# Patient Record
Sex: MALE | Race: WHITE | NOT HISPANIC OR LATINO
[De-identification: names, ages, dates, MRNs, and addresses within clinical notes are randomized per-mention and may not be internally consistent; named-entity substitution may affect disease eponyms.]

---

## 2017-01-09 ENCOUNTER — APPOINTMENT (OUTPATIENT)
Dept: ORTHOPEDIC SURGERY | Facility: CLINIC | Age: 42
End: 2017-01-09

## 2017-01-18 ENCOUNTER — RX RENEWAL (OUTPATIENT)
Age: 42
End: 2017-01-18

## 2017-02-13 ENCOUNTER — APPOINTMENT (OUTPATIENT)
Dept: ORTHOPEDIC SURGERY | Facility: CLINIC | Age: 42
End: 2017-02-13

## 2017-02-13 DIAGNOSIS — D17.79 BENIGN LIPOMATOUS NEOPLASM OF OTHER SITES: ICD-10-CM

## 2017-03-23 ENCOUNTER — RX RENEWAL (OUTPATIENT)
Age: 42
End: 2017-03-23

## 2017-04-25 ENCOUNTER — RX RENEWAL (OUTPATIENT)
Age: 42
End: 2017-04-25

## 2017-05-18 ENCOUNTER — RX RENEWAL (OUTPATIENT)
Age: 42
End: 2017-05-18

## 2017-07-31 ENCOUNTER — RX RENEWAL (OUTPATIENT)
Age: 42
End: 2017-07-31

## 2017-08-14 ENCOUNTER — RX RENEWAL (OUTPATIENT)
Age: 42
End: 2017-08-14

## 2017-11-28 ENCOUNTER — RX RENEWAL (OUTPATIENT)
Age: 42
End: 2017-11-28

## 2018-01-08 ENCOUNTER — MOBILE ON CALL (OUTPATIENT)
Age: 43
End: 2018-01-08

## 2018-02-26 ENCOUNTER — APPOINTMENT (OUTPATIENT)
Dept: ORTHOPEDIC SURGERY | Facility: CLINIC | Age: 43
End: 2018-02-26

## 2021-08-21 ENCOUNTER — TRANSCRIPTION ENCOUNTER (OUTPATIENT)
Age: 46
End: 2021-08-21

## 2021-08-23 ENCOUNTER — TRANSCRIPTION ENCOUNTER (OUTPATIENT)
Age: 46
End: 2021-08-23

## 2022-05-10 ENCOUNTER — NON-APPOINTMENT (OUTPATIENT)
Age: 47
End: 2022-05-10

## 2022-05-18 ENCOUNTER — APPOINTMENT (OUTPATIENT)
Dept: GASTROENTEROLOGY | Facility: CLINIC | Age: 47
End: 2022-05-18

## 2022-05-18 ENCOUNTER — APPOINTMENT (OUTPATIENT)
Dept: GASTROENTEROLOGY | Facility: CLINIC | Age: 47
End: 2022-05-18
Payer: COMMERCIAL

## 2022-05-18 VITALS
HEART RATE: 70 BPM | OXYGEN SATURATION: 98 % | TEMPERATURE: 98.7 F | WEIGHT: 279 LBS | BODY MASS INDEX: 34.69 KG/M2 | DIASTOLIC BLOOD PRESSURE: 80 MMHG | SYSTOLIC BLOOD PRESSURE: 135 MMHG | HEIGHT: 75 IN

## 2022-05-18 DIAGNOSIS — R10.9 UNSPECIFIED ABDOMINAL PAIN: ICD-10-CM

## 2022-05-18 DIAGNOSIS — R19.7 DIARRHEA, UNSPECIFIED: ICD-10-CM

## 2022-05-18 PROCEDURE — 99203 OFFICE O/P NEW LOW 30 MIN: CPT

## 2022-05-18 RX ORDER — TRAMADOL HYDROCHLORIDE AND ACETAMINOPHEN 37.5; 325 MG/1; MG/1
37.5-325 TABLET, FILM COATED ORAL
Qty: 60 | Refills: 0 | Status: DISCONTINUED | COMMUNITY
Start: 2017-01-09 | End: 2022-05-18

## 2022-05-18 RX ORDER — IBUPROFEN 800 MG/1
800 TABLET, FILM COATED ORAL
Qty: 90 | Refills: 3 | Status: DISCONTINUED | COMMUNITY
Start: 2018-01-08 | End: 2022-05-18

## 2022-05-18 NOTE — PHYSICAL EXAM
[General Appearance - Alert] : alert [General Appearance - In No Acute Distress] : in no acute distress [Sclera] : the sclera and conjunctiva were normal [Extraocular Movements] : extraocular movements were intact [Neck Appearance] : the appearance of the neck was normal [] : no respiratory distress [Exaggerated Use Of Accessory Muscles For Inspiration] : no accessory muscle use [Abdomen Soft] : soft [Abdomen Tenderness] : non-tender [Oriented To Time, Place, And Person] : oriented to person, place, and time [Impaired Insight] : insight and judgment were intact

## 2022-05-18 NOTE — HISTORY OF PRESENT ILLNESS
[de-identified] : 46M here after recent urgent care visit. Went to Urgent Care about one week ago w/ frequent yellow watery bowel movements (up to 10x per day) and diffuse cramping abdominal pain. No fevers, chills, hematochezia, n/v. Was concerned he had giardia as he has had it before. 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 He was prescribed flagyl, completed course today. Feels well now after abx. Currently having normal formed bowel movements 1-2x per day. Abdominal pain resolved. Appetite is back. No complaints today. \par \par Receives care at [facility] and follows w GI there. \par Had an egd/colonoscopy about 1 month ago, EGD reportedly showing inflammation at GE junction and colonoscopy significant for 2-3 benign polyps. No report available. \par \par No fhx of GI malignancy or IBD. \par

## 2022-05-18 NOTE — ASSESSMENT
[FreeTextEntry1] : 46M here after recent urgent care visit for cramping abdominal pain, diarrhea, urgency. Now resolved after tx w/ course of flagyl x 7d. Back to normal now, having formed 1-2 bms per day. Appetite and energy level normal. No abd pain, n/v, fevers, chills. Benign abdominal exam in office today. 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 No stool studies sent at time of urgent care visit. If recurrent diarrhea, will send stool studies \par - Advance diet as tolerated \par - Consider adding fermented foods or pre/probiotics to diet in post infectious state to aid gut microbiome \par - Otherwise, will continue to follow up w/ GI at [facility]. Recently had egd/colon as above (1 month ago, no report available) and will make appt to see them to discuss next steps and surveillance schedule \par \par RTC PRN \par

## 2024-02-24 ENCOUNTER — APPOINTMENT (OUTPATIENT)
Dept: ORTHOPEDIC SURGERY | Facility: CLINIC | Age: 49
End: 2024-02-24
Payer: OTHER MISCELLANEOUS

## 2024-02-24 PROCEDURE — 99204 OFFICE O/P NEW MOD 45 MIN: CPT

## 2024-02-24 PROCEDURE — 72100 X-RAY EXAM L-S SPINE 2/3 VWS: CPT

## 2024-02-24 RX ORDER — METHYLPREDNISOLONE 4 MG/1
4 TABLET ORAL
Qty: 1 | Refills: 0 | Status: ACTIVE | COMMUNITY
Start: 2024-02-24 | End: 1900-01-01

## 2024-02-26 ENCOUNTER — APPOINTMENT (OUTPATIENT)
Dept: MRI IMAGING | Facility: CLINIC | Age: 49
End: 2024-02-26

## 2024-02-26 ENCOUNTER — APPOINTMENT (OUTPATIENT)
Dept: MRI IMAGING | Facility: CLINIC | Age: 49
End: 2024-02-26
Payer: OTHER MISCELLANEOUS

## 2024-02-26 PROCEDURE — 72148 MRI LUMBAR SPINE W/O DYE: CPT

## 2024-02-26 PROCEDURE — 72158 MRI LUMBAR SPINE W/O & W/DYE: CPT

## 2024-02-26 NOTE — DATA REVIEWED
[Cervical Spine] : cervical spine [Outside X-rays] : outside x-rays [I independently reviewed and interpreted images and report] : I independently reviewed and interpreted images and report [FreeTextEntry2] : negative for fracture [FreeTextEntry1] : I stop paperwork reviewed

## 2024-02-26 NOTE — DISCUSSION/SUMMARY
[de-identified] : Discussed medical mgmt , prescribed Medrol pack, resume nsaid after steroid taper. Discussed exercise based rehabilitation, referred to PT. Referred for lumbar mri to r/o recurrent hnp.

## 2024-02-26 NOTE — PHYSICAL EXAM
[Normal Coordination] : normal coordination [Normal DTR UE/LE] : normal DTR UE/LE  [Normal Sensation] : normal sensation [Normal Mood and Affect] : normal mood and affect [Oriented] : oriented [Able to Communicate] : able to communicate [Normal Skin] : normal skin [No Rash] : no rash [No Ulcers] : no ulcers [No Lesions] : no lesions [No obvious lymphadenopathy in areas examined] : no obvious lymphadenopathy in areas examined [Well Developed] : well developed [Peripheral vascular exam is grossly normal] : peripheral vascular exam is grossly normal [No Respiratory Distress] : no respiratory distress [Extension] : extension [Flexion] : flexion [] : mildly antalgic
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
## 2024-03-04 ENCOUNTER — APPOINTMENT (OUTPATIENT)
Dept: ORTHOPEDIC SURGERY | Facility: CLINIC | Age: 49
End: 2024-03-04
Payer: OTHER MISCELLANEOUS

## 2024-03-04 VITALS — HEIGHT: 75 IN | BODY MASS INDEX: 34.69 KG/M2 | WEIGHT: 279 LBS

## 2024-03-04 PROCEDURE — 99214 OFFICE O/P EST MOD 30 MIN: CPT

## 2024-03-04 RX ORDER — CYCLOBENZAPRINE HYDROCHLORIDE 5 MG/1
5 TABLET, FILM COATED ORAL 3 TIMES DAILY
Qty: 90 | Refills: 0 | Status: ACTIVE | COMMUNITY
Start: 2024-03-04 | End: 1900-01-01
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
## 2024-03-04 NOTE — DISCUSSION/SUMMARY
[de-identified] : MRI reviewed demonstrating L4/5 left paracentral hnp . rt foraminal herniation L4/5. L5S1 left paracentral hnp. L3/4 prior discectomy w/o recurrent hnp. Advised the patient MRI images does not have the appearance for acute surgical intervention. He will continue his current PT trial, as well as muscle relaxer and NSAID as needed. Medication renewed. Can always refer to PM for LESI if pain persists. FUV in 6 wks. Remains temporarily totally disabled from customary work.  Prior to appointment and during encounter with patient extensive medical records were reviewed including but not limited to, hospital records, outpatient records, imaging results, and lab data.During this appointment the patient was examined, diagnoses were discussed and explained in a face to face manner. In addition extensive time was spent reviewing aforementioned diagnostic studies. Counseling including abnormal image results, differential diagnoses, treatment options, risk and benefits, lifestyle changes, current condition, and current medications was performed. Patient's comments, questions, and concerns were addressed and patient verbalized understanding. Based on this patient's presentation at our office, which is an orthopedic spine surgeon's office, this patient inherently / intrinsically has a risk, however minute, of developing issues such as Cauda equina syndrome, bowel and bladder changes, or progression of motor or neurological deficits such as paralysis which may be permanent.  HENNA BAKER Acting as a Scribe for Dr. Too MOJICA, Henna Baker, attest that this documentation has been prepared under the direction and in the presence of Provider Devyn Gage MD.

## 2024-03-04 NOTE — PHYSICAL EXAM
[Normal DTR UE/LE] : normal DTR UE/LE  [Normal Coordination] : normal coordination [Normal Mood and Affect] : normal mood and affect [Normal Sensation] : normal sensation [Oriented] : oriented [No Rash] : no rash [Able to Communicate] : able to communicate [Normal Skin] : normal skin [No Lesions] : no lesions [No Ulcers] : no ulcers [No obvious lymphadenopathy in areas examined] : no obvious lymphadenopathy in areas examined [Peripheral vascular exam is grossly normal] : peripheral vascular exam is grossly normal [Well Developed] : well developed [No Respiratory Distress] : no respiratory distress [Flexion] : flexion [Extension] : extension [] : negative Spurling

## 2024-03-05 ENCOUNTER — TRANSCRIPTION ENCOUNTER (OUTPATIENT)
Age: 49
End: 2024-03-05

## 2024-03-18 ENCOUNTER — APPOINTMENT (OUTPATIENT)
Dept: ORTHOPEDIC SURGERY | Facility: CLINIC | Age: 49
End: 2024-03-18
Payer: OTHER MISCELLANEOUS

## 2024-03-18 VITALS — HEIGHT: 75 IN | WEIGHT: 279 LBS | BODY MASS INDEX: 34.69 KG/M2

## 2024-03-18 DIAGNOSIS — M25.552 PAIN IN LEFT HIP: ICD-10-CM

## 2024-03-18 PROCEDURE — 99214 OFFICE O/P EST MOD 30 MIN: CPT

## 2024-03-18 PROCEDURE — 73503 X-RAY EXAM HIP UNI 4/> VIEWS: CPT | Mod: LT
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
## 2024-03-18 NOTE — DISCUSSION/SUMMARY
[de-identified] : The patient was advised of the diagnosis.  The natural history of the pathology was explained in full to the patient in layman's terms. All questions were answered.  The risks and benefits of surgical and non-surgical treatment alternatives were explained in full to the patient.

## 2024-03-18 NOTE — IMAGING
[Left] : left hip with pelvis [AP] : anteroposterior [Lateral] : lateral [Femoral CAM lesion with Alpha angle greater than 50] : Femoral CAM lesion with Alpha angle greater than 50 [de-identified] : Left hip: No swelling.  Iliac crest tenderness, greater troch tenderness.  Low back tenderness.  Neg impingement.  No pain with resisted SLR.  Neg SABRINA.   Limited IR.  NVI.  Lateral pain with resisted abduction.

## 2024-03-18 NOTE — WORK
[Partial] : partial [Does not reveal pre-existing condition(s) that may affect treatment/prognosis] : does not reveal pre-existing condition(s) that may affect treatment/prognosis [Unknown at this time] : : unknown at this time [Can return to work with limitations on ______] : can return to work with limitations on [unfilled] [No Rx restrictions] : No Rx restrictions. [Patient] : patient [I provided the services listed above] :  I provided the services listed above. [FreeTextEntry1] : fair

## 2024-04-08 ENCOUNTER — APPOINTMENT (OUTPATIENT)
Dept: ORTHOPEDIC SURGERY | Facility: CLINIC | Age: 49
End: 2024-04-08
Payer: OTHER MISCELLANEOUS

## 2024-04-08 VITALS — BODY MASS INDEX: 36.68 KG/M2 | HEIGHT: 75 IN | WEIGHT: 295 LBS

## 2024-04-08 PROCEDURE — 99214 OFFICE O/P EST MOD 30 MIN: CPT

## 2024-04-09 ENCOUNTER — APPOINTMENT (OUTPATIENT)
Dept: PAIN MANAGEMENT | Facility: CLINIC | Age: 49
End: 2024-04-09
Payer: OTHER MISCELLANEOUS

## 2024-04-09 VITALS — HEIGHT: 75 IN | WEIGHT: 295 LBS | BODY MASS INDEX: 36.68 KG/M2

## 2024-04-09 PROCEDURE — 99204 OFFICE O/P NEW MOD 45 MIN: CPT

## 2024-04-09 NOTE — PHYSICAL EXAM
[Normal Coordination] : normal coordination [Normal DTR UE/LE] : normal DTR UE/LE  [Normal Sensation] : normal sensation [Normal Mood and Affect] : normal mood and affect [Oriented] : oriented [Able to Communicate] : able to communicate [Normal Skin] : normal skin [No Rash] : no rash [No Ulcers] : no ulcers [No Lesions] : no lesions [No obvious lymphadenopathy in areas examined] : no obvious lymphadenopathy in areas examined [Well Developed] : well developed [Peripheral vascular exam is grossly normal] : peripheral vascular exam is grossly normal [No Respiratory Distress] : no respiratory distress [Flexion] : flexion [Extension] : extension [] : negative Spurling

## 2024-04-09 NOTE — DISCUSSION/SUMMARY
[de-identified] : MRI reviewed demonstrating L4/5 left paracentral hnp . rt foraminal herniation L4/5. L5S1 left paracentral hnp. L3/4 prior discectomy w/o recurrent hnp. Advised the patient MRI images does not have the appearance for acute surgical intervention. He will continue his current PT trial and transition into HEP, as well as muscle relaxer and NSAID as needed. Medication renewed. He will keep consult with Dr. Laura to discuss possible interventional injections. Patient will return to full duty work starting 4/17/24 - work note provided. FUV PRN.   Prior to appointment and during encounter with patient extensive medical records were reviewed including but not limited to, hospital records, outpatient records, imaging results, and lab data.During this appointment the patient was examined, diagnoses were discussed and explained in a face to face manner. In addition extensive time was spent reviewing aforementioned diagnostic studies. Counseling including abnormal image results, differential diagnoses, treatment options, risk and benefits, lifestyle changes, current condition, and current medications was performed. Patient's comments, questions, and concerns were addressed and patient verbalized understanding. Based on this patient's presentation at our office, which is an orthopedic spine surgeon's office, this patient inherently / intrinsically has a risk, however minute, of developing issues such as Cauda equina syndrome, bowel and bladder changes, or progression of motor or neurological deficits such as paralysis which may be permanent.  HENNA JACOB Acting as a Scribe for Henna Gr, attest that this documentation has been prepared under the direction and in the presence of Provider Devyn Gage MD.

## 2024-04-09 NOTE — DATA REVIEWED
[MRI] : MRI [Lumbar Spine] : lumbar spine [Report was reviewed and noted in the chart] : The report was reviewed and noted in the chart [I independently reviewed and interpreted images and report] : I independently reviewed and interpreted images and report [I reviewed the films/CD and additionally noted] : I reviewed the films/CD and additionally noted [FreeTextEntry1] : I stop paperwork reviewed PT progress notes reviewed Orthopedic progress notes reviewed
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
## 2024-04-09 NOTE — WORK
[Was the competent medical cause of the injury] : was the competent medical cause of the injury [Are consistent with the injury] : are consistent with the injury [Consistent with my objective findings] : consistent with my objective findings [Partial] : partial [Patient] : patient [No Rx restrictions] : No Rx restrictions. [I provided the services listed above] :  I provided the services listed above.
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
## 2024-04-09 NOTE — PHYSICAL EXAM
[de-identified] : Constitutional:   - No acute distress   - Well developed; well nourished    Neurological:   - normal mood and affect   - alert and oriented x 3     Cardiovascular:   - grossly normal   Lumbar Spine Exam:   Inspection: Well healed surgical scar midline erythema (-)  ecchymosis (-)  rashes (-)  alignment: no scoliosis   Palpation:  Midline lumbar tenderness:            (-)  midline thoracic tenderness:          (-)  Lumbar paraspinal tenderness:  L (+) ; R (-)  thoracic paraspinal tenderness: L (-) ; R (-)  sciatic nerve tenderness :          L (-) ; R (-)  SI joint tenderness:                     L (-) ; R (-)  GTB tenderness:                        L (-);  R (-)   ROM: WNL  pain with extreme extension  Strength:                                     Right       Left     Hip Flexion:                (5/5)       (5/5)  Quadriceps:               (5/5)       (5/5)  Hamstrings:                (5/5)       (5/5)  Ankle Dorsiflexion:     (5/5)       (5/5)  EHL:                           (5/5)       (5/5)  Ankle Plantarflexion:  (5/5)       (5/5)   Special Tests:  SLR:                            R (-) ; L (=)  Facet loading:             R (-) ; L (+)  SABRINA test:                R (-) ; L (-)  Hamstring tightness:   R (+);  L (+)   Neurologic:  SILT throughout right lower extremity  SILT throughout left lower extremity   Reflexes normal and symmetric bilateral lower extremities   Gait:  non- antalgic gait  ambulates without assistive device

## 2024-04-09 NOTE — ASSESSMENT
[FreeTextEntry1] : A discussion regarding available pain management treatment options occurred with the patient.  These included interventional, rehabilitative, pharmacological, and alternative modalities. We will proceed with the following:    Interventional treatment options:   - Proceed with left L3-L4, L4-L5 TFESI with fluoroscopic guidance - Will consider left facet directed intervention with ongoing axial low back pain - see additional instructions below    Rehabilitative options:  - continue physical therapy   - participation in active HEP was discussed and encouraged as tolerated  Medication based treatment options:   - Continue ibuprofen 400-600 mg up to TID as needed - continue cyclobenzaprine 5 mg up to TID as needed for spasm - see additional instructions below    Complementary treatment options:   - Weight management and lifestyle modifications discussed  Additional treatment recommendations as follows:   - patient will follow-up with Dr. Gage as directed - Follow up 1-2 weeks post injection for assessment of efficacy and further treatment recommendations  We have discussed the risks, benefits, and alternatives for NSAID therapy including but not limited to the risk of bleeding, thrombosis, gastric mucosal irritation/ulceration, allergic reaction and kidney dysfunction.  The patient verbalizes an understanding.  The risks, benefits and alternatives of the proposed procedure were explained in detail with the patient. The risks outlined include but are not limited to infection, bleeding, post- dural puncture headache, nerve injury, a temporary increase in pain, failure to resolve symptoms, need for future interventions, allergic reaction, and possible elevation of blood sugar in diabetics if using corticosteroid.  All questions were answered to patient's apparent satisfaction, and he/she verbalized an understanding.  The documentation recorded by the scribe, in my presence, accurately reflects the service I personally performed and the decisions made by me with my edits as appropriate.   I, Mauro Rodriguez acting as scribe, attest that this documentation has been prepared under the direction and in the presence of Provider Porfirio Laura DO.

## 2024-05-03 ENCOUNTER — APPOINTMENT (OUTPATIENT)
Dept: PAIN MANAGEMENT | Facility: CLINIC | Age: 49
End: 2024-05-03
Payer: OTHER MISCELLANEOUS

## 2024-05-03 PROCEDURE — 64483 NJX AA&/STRD TFRM EPI L/S 1: CPT | Mod: LT

## 2024-05-03 PROCEDURE — 64484 NJX AA&/STRD TFRM EPI L/S EA: CPT | Mod: LT,59

## 2024-05-03 NOTE — PROCEDURE
[FreeTextEntry3] : Date of Service: 05/03/2024   Account: [id number]  Patient: MARIO BENNETT   YOB: 1975  Age: 48 year  Surgeon: Porfirio Laura D.O.  Assistant: None.  Pre-Operative Diagnosis:   Lumbosacral Radiculitis (M54.17)  Post Operative Diagnosis: Lumbosacral Radiculitis (M54.17)  Procedure: Left L3-L4, L4-L5 transforaminal epidural steroid injection under fluoroscopic guidance.  Anesthesia: MAC  This procedure was carried out using fluoroscopic guidance.  The risks and benefits of the procedure were discussed extensively with the patient.  The consent of the patient was obtained and the following procedure was performed. The patient was placed in the prone position on the fluoroscopy table and the area was prepped and draped in a sterile fashion.  A timeout was performed with all essential staff present and the site and side were verified.  The left L3-L4 neural foramen was then identified on right oblique "mary lou dog" anatomical view at the 6 o' clock position using fluoroscopic guidance, and the area was marked. The overlying skin and subcutaneous structures were anesthetized using sterile technique with 1% Lidocaine.   A 22-gauge 5-inch spinal needle was directed toward the inferior (6 o'clock) position of the pedicle, which formed the roof of the identified foramen.  Once in the epidural space, after negative aspiration for heme and CSF, 1cc of Omnipaque contrast was injected to confirm epidural location and assess filling defects and rule out intravascular needle placement.  Lumbar epidurogram showed no intravascular or intrathecal flow pattern.  No blood or CSF was aspirated.  Omnipaque spread medially in epidural space and outlined the exiting nerve root.  After this, 2.5 cc of a mixture of 4cc of preservative free normal saline plus 40mg of Kenalog was injected in the epidural space  The left L4-L5 neural foramen was then identified on right oblique "mary lou dog" anatomical view at the 6 o' clock position using fluoroscopic guidance, and the area was marked. The overlying skin and subcutaneous structures were anesthetized using sterile technique with 1% Lidocaine.   A 22-gauge 5-inch spinal needle was directed toward the inferior (6 o'clock) position of the pedicle, which formed the roof of the identified foramen.  Once in the epidural space, after negative aspiration for heme and CSF, 1cc of Omnipaque contrast was injected to confirm epidural location and assess filling defects and rule out intravascular needle placement.  Lumbar epidurogram showed no intravascular or intrathecal flow pattern.  No blood or CSF was aspirated. Omnipaque spread medially in epidural space and outlined the exiting nerve root.  After this, the remainder of the injectate listed above was injected in the epidural space.  Vital signs remained normal throughout the procedure.  The patient tolerated the procedure well.  There were no immediate complications from the performed procedure.  The patient was instructed to apply ice over the injection sites for twenty minutes every two hours for the next 24 hours.  Disposition:      1. The patient was advised to F/U in 1-2 weeks to assess the response to the injection.      2. The patient was also instructed to contact me immediately if there were any concerns related to the procedure performed.

## 2024-05-23 ENCOUNTER — APPOINTMENT (OUTPATIENT)
Dept: PAIN MANAGEMENT | Facility: CLINIC | Age: 49
End: 2024-05-23
Payer: OTHER MISCELLANEOUS

## 2024-05-23 DIAGNOSIS — M51.36 OTHER INTERVERTEBRAL DISC DEGENERATION, LUMBAR REGION: ICD-10-CM

## 2024-05-23 DIAGNOSIS — M47.816 SPONDYLOSIS W/OUT MYELOPATHY OR RADICULOPATHY, LUMBAR REGION: ICD-10-CM

## 2024-05-23 PROCEDURE — 99214 OFFICE O/P EST MOD 30 MIN: CPT
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
## 2024-05-23 NOTE — PHYSICAL EXAM
[de-identified] : Constitutional:   - No acute distress   - Well developed; well nourished    Neurological:   - normal mood and affect   - alert and oriented x 3     Cardiovascular:   - grossly normal   Lumbar Spine Exam:   Inspection: Well healed surgical scar midline erythema (-)  ecchymosis (-)  rashes (-)  alignment: no scoliosis   Palpation:  Midline lumbar tenderness:            (-)  midline thoracic tenderness:          (-)  Lumbar paraspinal tenderness:  L (+) ; R (-)  thoracic paraspinal tenderness: L (-) ; R (-)  sciatic nerve tenderness :          L (-) ; R (-)  SI joint tenderness:                     L (-) ; R (-)  GTB tenderness:                        L (-);  R (-)   ROM: WNL  pain with extreme extension  Strength:                                     Right       Left     Hip Flexion:                (5/5)       (5/5)  Quadriceps:               (5/5)       (5/5)  Hamstrings:                (5/5)       (5/5)  Ankle Dorsiflexion:     (5/5)       (5/5)  EHL:                           (5/5)       (5/5)  Ankle Plantarflexion:  (5/5)       (5/5)   Special Tests:  SLR:                            R (-) ; L (=)  Facet loading:             R (-) ; L (+)  SABRINA test:                R (-) ; L (-)  Hamstring tightness:   R (+);  L (+)   Neurologic:  SILT throughout right lower extremity  SILT throughout left lower extremity   Reflexes normal and symmetric bilateral lower extremities   Gait:  non- antalgic gait  ambulates without assistive device

## 2024-05-23 NOTE — ASSESSMENT
[FreeTextEntry1] : A discussion regarding available pain management treatment options occurred with the patient.  These included interventional, rehabilitative, pharmacological, and alternative modalities. We will proceed with the following:    Interventional treatment options:   - Proceed with repeat left L3-L4, L4-L5 TFESI (80 mg Kenalog) with fluoroscopic guidance - Will consider lumbar facet directed intervention (vs. BVN ablation) for ongoing axial low back pain - see additional instructions below    Rehabilitative options:  - continue physical therapy   - participation in active HEP was discussed and encouraged as tolerated  Medication based treatment options:   - continue ibuprofen 400-600 mg up to TID as needed - continue cyclobenzaprine 5 mg up to TID as needed for spasm - see additional instructions below    Complementary treatment options:   - Weight management and lifestyle modifications discussed  Additional treatment recommendations as follows:   - patient will follow-up with Dr. Gage as directed - Follow up 1-2 weeks post injection for assessment of efficacy and further treatment recommendations  We have discussed the risks, benefits, and alternatives for NSAID therapy including but not limited to the risk of bleeding, thrombosis, gastric mucosal irritation/ulceration, allergic reaction and kidney dysfunction.  The patient verbalizes an understanding.  The risks, benefits and alternatives of the proposed procedure were explained in detail with the patient. The risks outlined include but are not limited to infection, bleeding, post- dural puncture headache, nerve injury, a temporary increase in pain, failure to resolve symptoms, need for future interventions, allergic reaction, and possible elevation of blood sugar in diabetics if using corticosteroid.  All questions were answered to patient's apparent satisfaction, and he/she verbalized an understanding.  The documentation recorded by the scribe, in my presence, accurately reflects the service I personally performed and the decisions made by me with my edits as appropriate.   I, Mauro Rodriguez acting as scribe, attest that this documentation has been prepared under the direction and in the presence of Provider Porfirio Laura DO.

## 2024-05-23 NOTE — WORK
[Partial] : partial [Patient] : patient [No Rx restrictions] : No Rx restrictions. [I provided the services listed above] :  I provided the services listed above.

## 2024-05-29 ENCOUNTER — APPOINTMENT (OUTPATIENT)
Dept: ORTHOPEDIC SURGERY | Facility: CLINIC | Age: 49
End: 2024-05-29
Payer: OTHER MISCELLANEOUS

## 2024-05-29 VITALS — HEIGHT: 75 IN | WEIGHT: 295 LBS | BODY MASS INDEX: 36.68 KG/M2

## 2024-05-29 DIAGNOSIS — M54.16 RADICULOPATHY, LUMBAR REGION: ICD-10-CM

## 2024-05-29 DIAGNOSIS — M51.26 OTHER INTERVERTEBRAL DISC DISPLACEMENT, LUMBAR REGION: ICD-10-CM

## 2024-05-29 PROCEDURE — 99214 OFFICE O/P EST MOD 30 MIN: CPT

## 2024-05-30 PROBLEM — M54.16 LUMBAR RADICULOPATHY: Status: ACTIVE | Noted: 2024-02-24
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
## 2024-05-30 NOTE — PHYSICAL EXAM
Patient: Yoselyn Cisneros   MRN: [id number]  YOB: 1963     Diagnosis: AML   Cytogenetics:Losses of chromosomes 4 and 17 with a deletion in 5q   NGS:NGS significant for TP53, IDH2, and DNMT3A mutations  Prior Therapy: 7+3 refractory, aza+venetoclax+enesidnib.  Complications:  Neutropenic fever.  Disease status at transplant:    9/30/21:   -Chromosomal evidence of persistent myeloid  neoplasm, please see comment.   -Hypocellular marrow (30% cellularity) with cellular  debris and essentially absent granulopoiesis.   -No significant myelodysplasia and no increase in  blasts.   -Complex abnormal female karyotype   10/18/21:   -No morphologic or immunophenotypic evidence of  myeloid neoplasm, please see comment.   -Markedly hypocellular marrow (<5% cellularity) with  cellular debris and essentially absent granulopoiesis.   -No significant myelodysplasia and no increase in  blasts.   -Stains for infectious organisms are pending.   -Chromosome analysis and FISH assay are also  pending  Conditioning: Bu/Flu AUC 7533-6247  GVHD Prophylaxis: Tac/MTX  Donor: male/MSD 10/10, CMV - A+  Recipient Blood Group: A+ / CMV +  Date of Transplant: 11/16/21  Engraftment date:             Last platelet transfusion:             Last RCM transfusion:            Post-transplant zarxio stopped: NA  CD34 count/cryopreserved:  Transplant Dose:  5 x 10^6 CD34/Kg  Stored DLI:  6 bags @ 1x10^7 CD3/kg  Other stored: 1 bag @x 10^6 CD34/kg  Post Transplant Maintenance Plan:  Day post Transplant: day+50  Donor chimerism day 30: 12/13=98%->98%  1/3/22->98%.        TRANSPLANT WORK UP:  Transplant type: allogeneic MSD 10/10  KPS: 70%  HCT-CI: 1  Skillman Frailty Score: 2/17 not frail  Presented at Tumor Board: 11/9/21  Echo: 11/5/21 EF 63%  PFT: FEV1 119%, FEV1/COO=904%, DLCO 90%  Dental: cleared  Sickle Cell: NA  CXR: neg     HLA Information Blood Type CMV A A B B C C DRB1 DRB1 DQ DQ   Recipient A+ + 11:01 24:02 40:01 51:01 03:04 15:02 04:04  11:01 03:02 03:01   Donor A+ - 11:01 24:02 40:01 51:01 03:04 15:02 04:04 11:01 03:02 03:01      Test Result Comments   ABO A+     CMV +     STS -     HBsAG -     HCV -     HIV -     HBc -     HTLV -     YASMIN -     HBVNT       HCVNT -     HIVNT -     WNV -         DP matched    HEME/ONC HISTORY:  Ms. Yoselyn Cisneros is a 58 year old female initially admitted for thrombocytopenia and concern for acute leukemia. At the time of admission few petechiae were present within the oral cavity and occasional bloody nasal sputum. Peripheral smear was concerning for acute leukemia.  CBC also showed 16% blasts.  A bone marrow biopsy was completed on 8/18/21 confirming the diagnosis of Acute myeloid leukemia with 22% marrow blasts with Delonte rods, dysplastic neutrophils and megakaryocytes. Chromosome analysis negative for FLT3. NGS significant for TP53, IDH2, and DNMT3A mutations. She received induction treatment with 7+3. Throughout treatment peripheral blasts were noted in the periphery. A day 14 bmbx was completed on 9/1/21 showing residual acute myeloid leukemia (67% blasts on touch preparation). She was re-inducted with vidaza + venetoclax and Idhifa added as an outpatient.     CHEMO REGIMEN: Flu/Bu AUC 0941-8079  Oral Chemotherapy:  Is patient on oral chemotherapy?  NO    HISTORY OF PRESENT ILLNESS:  Ms. Yoselyn Cisneros is a 58 year old female admitted to the hospital for allogeneic stem cell transplant.      12/3: Patient  Was discharged from the hospital yesterday. Denies nausea. Had 2 episodes of diarrhea. Took lomotil with good relief. Has mild oral discomfort. Feels fatigued. Appetite is poor but she is eating and drinking an adequate amount of fluid.     12/6: She was in daily over the weekend received IV supplementation of electrolytes.  Complains of fatigue.  Denies nausea.  States that appetite has improved slightly.  Denies any further mouth tenderness.  Having diarrhea approximately once a day, relieved with Lomotil.   Complains of intermittent pain in her fingertips when she touches things.  Denies cough, shortness of breath, fever, chills, rash.    12/8/21:  No N/V/d or fever no skin rash. She is otherwise feeling well. Not on oral magnesium because of diarrhea and will wait another week to two weeks.    12/10/21: Afebrile and denies n/v.  Stools are soft.  The previously noted wound on her coccyx \"opened up\" a few days ago.  Her  is putting her creams (prescribed by wound care) for her and is helping to keep an eye on it.  The overall size of the wound is decreased; appears moist with a closed wound bed.  We discussed keeping the area dry and calling with fevers or worsening pain.  Will reassess at the next visit.    12/13/21:  She feels well . No n/V/D or fever.  The wound on her buttock is less painful.      12/14/2021:   Now has erythema behind her left leg again and the perianal lesion also appears more erythematous. Will start her on Iv dapto x 14 days. She is afebrile.  Denies any diarrhea nausea or vomiting        12/15/2021:  Started dapto on 12/14. No worsening noted in lesions above.  Will continue on IV dapto for now.  No N/V?D or fever.     12/20: stable erythema / perianal wound.  On IV dapto- wound care has seen her.  Stable at this time.    12/22/21:  Perianal lesion appears to be healing.  Skin at the back of her knee is not painful. But the area of erythema is the same.  12/27: completed dapto, afebrile. No N/V/D.  Rash stable behind knee  On topical wound care for perianal lesion no worsening noted. It appears to be healing well.     12/29: she feels well donor chimerism stays at 98%, will continue to follow weekly.   No N/V/D or fever. No s/s of GVHD.    1/5/22: Denies any s/s of GVHD cimerism from 12/27 pending  Platelets are dropping , ID markers not positive.  Rash on the back of her leg and perianal lesion are improved  Needed fluids last week k is better  Still needs magnesium iv.    INTERVAL  HISTORY:  1/7/22:  States that she had nausea yesterday afternoon, resolved with her oral antiemetic.  Denies diarrhea, vomiting.  States that perianal lesion has almost resolved and she no longer has erythema to the back of her thigh.  In addition, erythema to her right axilla has resolved.  Denies fever, chills, cough, shortness of breath.  Overall, she is feeling well well.  Energy level and appetite are improving.    REVIEW OF SYSTEMS:  All systems were reviewed including Constitutional, HEENT, Endocrine, Hematologic, Lymphatic, Respiratory, Cardiovascular, Gastrointestinal, Genitourinary, Musculoskeletal, Integumentary, Neurologic, Psychiatric and are negative other than as detailed in HPI or above.     Lines: trifusion    Weights:  Admit: 88.8kg    Physical Examination:   GENERAL: Alert and oriented  LYMPH NODES: No cervical adenopathy, no supraclavicular adenopathy, no axillary adenopathy  SKIN: Pallor. No rashes, petechiae, bruising.   HEENT:  Unremarkable. No oral exudates or ulcers.  Lips are dry.  CHEST: Respiratory effort is not labored.  LUNGS: Clear to auscultation bilaterally  HEART: RRR, no murmurs  ABDOMEN: Abdomen is soft, normal active bowel sounds, nontender to palpation  NEUROLOGIC: No focal deficits  EXTREMITIES: No edema in the upper/lower extremities bilaterally  GVHD: None noted      LABORATORY STUDIES:  WBC (K/mcL)   Date Value   01/07/2022 4.0 (L)     RBC (mil/mcL)   Date Value   01/07/2022 3.78 (L)     HCT (%)   Date Value   01/07/2022 37.4     HGB (g/dL)   Date Value   01/07/2022 12.2     PLT (K/mcL)   Date Value   01/07/2022 60 (L)     Sodium (mmol/L)   Date Value   01/07/2022 139     Potassium (mmol/L)   Date Value   01/07/2022 4.0     Chloride (mmol/L)   Date Value   01/07/2022 107     Glucose (mg/dL)   Date Value   01/07/2022 94     Calcium (mg/dL)   Date Value   01/07/2022 9.5     Carbon Dioxide (mmol/L)   Date Value   01/07/2022 28     BUN (mg/dL)   Date Value   01/07/2022 9      Creatinine (mg/dL)   Date Value   01/07/2022 0.74     CLINICAL IMPRESSION & PLAN:  1. AML  - today is day + 52 FluBu AUC 2486-1260.  Day 0 was 11/16/21  - ANC engrafted on day +10 11/26/21.   Donor chimerism =98%  Repeat donor chimerism on 12/27  Repeat chimerism stays at 98%.  1/3/22-98%      2. Immunosuppression  - Tacro  - Methotrexate d+1, d+3, d+6, d+11    3. Cardiovascular  - EF 63% 11/5/21    4. Pulmonary  - PFTs 11/8/21: Spirometry and flow volume loop are normal.  Measurements of lung volumes are normal except for low ERV, which could be due to body habitus. Diffusion capacity is normal. No previous comparison is available.    5. Neuro  - No concerns    6. Fluid/electrolytes/nutrition/volume status  - Protein calorie malnutrition: [] None  [] Mild (serum albumin 3.1-3.4)  [x] Moderate (serum albumin 2.4-3.0)  [] Severe (serum albumin <2.4).  Please see note from dietary for full assessment of degree of severity.  -12/8:  IV potassium and magnesium today.  No need for additional IV fluid.  -1/7/22:  IV magnesium today, no need for IV fluid.      7. ID  - 11/17: Temp 100.3 and patient appears ill.  Blood cultures done and cefepime started   11/21: change to dapto and zosyn.  Would continue until count recovery.  Erythema responding to antibiotics; will continue for now  - US of leg erythema and buttock wound showed no abscess or fluid collection.  Wound care following  - 7 days of dapto and zosyn, Engrafted and afebrile, to cipro 11/29 for 7 more days  12/13: wound care  And watch for infection.  12/14: start dapto for cellulitis. X 14 days.  12/15: continue on dapto  12/20: will continue till Friday on dapto.  12/27: completed dapto will monitor for now.  12/29: Her perianal lesion is healing , and the erythema behind knee has been stable.  1/5:  Perianal lesion is almost completely healed, no longer has erythema to posterior leg or in axillary area    8. Hematology  - Pancytopenia secondary to  chemotherapy or disease?  [x] Yes  [] No  - Transfuse 1 RCM for Hgb <7.0.  Transfuse 1 unit of platelets for platelet count <10.  All blood products should be irradiated.    9. GI  - Loose stools, controlled with imodium.  C.diff neg  - Grade 2 mucositis.  Will give 3 days of dex 10mg starting 11/29.  Also increasing protonix to BID.  12/3: Mucositis has improved. Occasional loose stool, relieved with lomotil. C-dif negative.   12/6:  Loose stool once per day, relieved with Imodium.  No longer has mouth tenderness.  Appetite is improving.  12/13: Avoid giving magnesium orally to prevent diarrhea       10. Prophylaxis  - DVT:   - GI: lomotil, protonix  - PCP: pentamidine starting d+30  - Anti-viral: valacyclovir  - Anti-fungal: posa starting d+1  - Anti-bacterial: cipro (last dose 12/5), levaquin starting 12/6  - VOD: Ursodiol      11.  Follow up:  -F/U M W F -Completed dapto -perianal lesion improving.  -IV magnesium supplement today, no need for IV fluid  -CMV 1/3 <150, monitor.   -Donor chimerism from 1/3 is 98%       The plan of care was discussed with the patient         Dinah Hurley NP   [Normal Coordination] : normal coordination [Normal DTR UE/LE] : normal DTR UE/LE  [Normal Sensation] : normal sensation [Normal Mood and Affect] : normal mood and affect [Oriented] : oriented [Able to Communicate] : able to communicate [Normal Skin] : normal skin [No Rash] : no rash [No Ulcers] : no ulcers [No Lesions] : no lesions [No obvious lymphadenopathy in areas examined] : no obvious lymphadenopathy in areas examined [Well Developed] : well developed [Peripheral vascular exam is grossly normal] : peripheral vascular exam is grossly normal [No Respiratory Distress] : no respiratory distress [Flexion] : flexion [Extension] : extension [] : negative Spurling

## 2024-05-30 NOTE — DATA REVIEWED
[MRI] : MRI [Lumbar Spine] : lumbar spine [Report was reviewed and noted in the chart] : The report was reviewed and noted in the chart [I independently reviewed and interpreted images and report] : I independently reviewed and interpreted images and report [I reviewed the films/CD and additionally noted] : I reviewed the films/CD and additionally noted [FreeTextEntry1] : I stop paperwork reviewed PT progress notes reviewed PAin mgmt progress notes reviewed

## 2024-05-30 NOTE — DISCUSSION/SUMMARY
[de-identified] : MRI demonstrating L4/5 left paracentral hnp . rt foraminal herniation L4/5. L5S1 left paracentral hnp. L3/4 prior discectomy w/o recurrent hnp. He will resume his formal PT trial as it was helpful in the past, as well as muscle relaxer and NSAID as needed. PT was renewed today. Patient will continue to consult with Dr. Tariq for ongoing interventional injections, patient experiencing sustained relief after LESI on May 3rd. Continue full duty work. Follow up in 6 weeks. Cumulative encounter duration exceeded 30 minutes.  Prior to appointment and during encounter with patient extensive medical records were reviewed including but not limited to, hospital records, outpatient records, imaging results, and lab data.During this appointment the patient was examined, diagnoses were discussed and explained in a face to face manner. In addition extensive time was spent reviewing aforementioned diagnostic studies. Counseling including abnormal image results, differential diagnoses, treatment options, risk and benefits, lifestyle changes, current condition, and current medications was performed. Patient's comments, questions, and concerns were addressed and patient verbalized understanding. Based on this patient's presentation at our office, which is an orthopedic spine surgeon's office, this patient inherently / intrinsically has a risk, however minute, of developing issues such as Cauda equina syndrome, bowel and bladder changes, or progression of motor or neurological deficits such as paralysis which may be permanent.  HENNA BAKER Acting as a Scribe for Dr. Too MOJICA, Henna Baker, attest that this documentation has been prepared under the direction and in the presence of Provider Devyn Gage MD.

## 2024-07-03 ENCOUNTER — APPOINTMENT (OUTPATIENT)
Dept: PAIN MANAGEMENT | Facility: CLINIC | Age: 49
End: 2024-07-03
Payer: OTHER MISCELLANEOUS

## 2024-07-03 PROCEDURE — 64484 NJX AA&/STRD TFRM EPI L/S EA: CPT | Mod: 59,LT

## 2024-07-03 PROCEDURE — 64483 NJX AA&/STRD TFRM EPI L/S 1: CPT | Mod: LT

## 2024-07-08 ENCOUNTER — APPOINTMENT (OUTPATIENT)
Dept: ORTHOPEDIC SURGERY | Facility: CLINIC | Age: 49
End: 2024-07-08
Payer: OTHER MISCELLANEOUS

## 2024-07-08 VITALS — BODY MASS INDEX: 36.68 KG/M2 | HEIGHT: 75 IN | WEIGHT: 295 LBS

## 2024-07-08 DIAGNOSIS — M54.16 RADICULOPATHY, LUMBAR REGION: ICD-10-CM

## 2024-07-08 DIAGNOSIS — M51.26 OTHER INTERVERTEBRAL DISC DISPLACEMENT, LUMBAR REGION: ICD-10-CM

## 2024-07-08 PROCEDURE — 99214 OFFICE O/P EST MOD 30 MIN: CPT

## 2024-07-23 ENCOUNTER — NON-APPOINTMENT (OUTPATIENT)
Age: 49
End: 2024-07-23

## 2024-07-30 ENCOUNTER — APPOINTMENT (OUTPATIENT)
Dept: PAIN MANAGEMENT | Facility: CLINIC | Age: 49
End: 2024-07-30
Payer: OTHER MISCELLANEOUS

## 2024-07-30 VITALS — BODY MASS INDEX: 37.05 KG/M2 | WEIGHT: 298 LBS | HEIGHT: 75 IN

## 2024-07-30 DIAGNOSIS — M51.36 OTHER INTERVERTEBRAL DISC DEGENERATION, LUMBAR REGION: ICD-10-CM

## 2024-07-30 DIAGNOSIS — M51.26 OTHER INTERVERTEBRAL DISC DISPLACEMENT, LUMBAR REGION: ICD-10-CM

## 2024-07-30 DIAGNOSIS — M47.816 SPONDYLOSIS W/OUT MYELOPATHY OR RADICULOPATHY, LUMBAR REGION: ICD-10-CM

## 2024-07-30 DIAGNOSIS — M54.16 RADICULOPATHY, LUMBAR REGION: ICD-10-CM

## 2024-07-30 PROCEDURE — 99214 OFFICE O/P EST MOD 30 MIN: CPT

## 2024-07-30 RX ORDER — GABAPENTIN 300 MG/1
300 CAPSULE ORAL 3 TIMES DAILY
Qty: 90 | Refills: 1 | Status: ACTIVE | COMMUNITY
Start: 2024-07-30 | End: 1900-01-01
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
## 2024-07-30 NOTE — DATA REVIEWED
[MRI] : MRI [Lumbar Spine] : lumbar spine [I reviewed the films/CD] : I reviewed the films/CD [Report was reviewed and noted in the chart] : The report was reviewed and noted in the chart

## 2024-07-30 NOTE — ASSESSMENT
[FreeTextEntry1] : A discussion regarding available pain management treatment options occurred with the patient.  These included interventional, rehabilitative, pharmacological, and alternative modalities. We will proceed with the following:    Interventional treatment options:  - None indicated at present time - Will consider lumbar facet directed intervention (vs. BVN ablation) for ongoing axial low back pain - see additional instructions below    Rehabilitative options:  - Restart physical therapy  - participation in active HEP was discussed and encouraged as tolerated - Home exercise sheet provided at prior visit  Medication based treatment options: - Initiate trial of gabapentin 300 mg TID; titration schedule provided - continue ibuprofen 400-600 mg up to TID as needed - continue cyclobenzaprine 5 mg up to TID as needed for spasm - see additional instructions below    Complementary treatment options:   - Weight management and lifestyle modifications discussed - Patient is pursuing more aggressive weight loss measures  Additional treatment recommendations as follows:   - patient will follow-up with Dr. Gage as directed - Follow up in 6 weeks  We have discussed the risks, benefits, and alternatives for NSAID therapy including but not limited to the risk of bleeding, thrombosis, gastric mucosal irritation/ulceration, allergic reaction and kidney dysfunction.  The patient verbalizes an understanding.  The documentation recorded by the scribe, in my presence, accurately reflects the service I personally performed and the decisions made by me with my edits as appropriate.   I, Mauro Rodriguez acting as scribe, attest that this documentation has been prepared under the direction and in the presence of Provider Porfirio Laura DO.

## 2024-07-30 NOTE — PHYSICAL EXAM
[de-identified] : Constitutional:   - No acute distress   - Well developed; well nourished    Neurological:   - normal mood and affect   - alert and oriented x 3     Cardiovascular:   - grossly normal   Lumbar Spine Exam:   Inspection: erythema (-)  ecchymosis (-)  rashes (-)  alignment: no scoliosis  Well healed surgical scar midline  Palpation:  Midline lumbar tenderness:            (-)  midline thoracic tenderness:          (-)  Lumbar paraspinal tenderness:  L (+) ; R (-)  thoracic paraspinal tenderness: L (-) ; R (-)  sciatic nerve tenderness :          L (-) ; R (-)  SI joint tenderness:                     L (-) ; R (-)  GTB tenderness:                        L (-);  R (-)   ROM: WNL  pain with extreme extension  Strength:                                     Right       Left     Hip Flexion:                (5/5)       (5/5)  Quadriceps:               (5/5)       (5/5)  Hamstrings:                (5/5)       (5/5)  Ankle Dorsiflexion:     (5/5)       (5/5)  EHL:                           (5/5)       (5/5)  Ankle Plantarflexion:  (5/5)       (5/5)   Special Tests:  SLR:                            R (-) ; L (=)  Facet loading:             R (-) ; L (+)  SABRINA test:                R (-) ; L (-)  Hamstring tightness:   R (+);  L (+)   Neurologic:  SILT throughout right lower extremity  SILT throughout left lower extremity   Reflexes normal and symmetric bilateral lower extremities   Gait:  non- antalgic gait  ambulates without assistive device

## 2024-07-30 NOTE — WORK
[No Rx restrictions] : No Rx restrictions. [I provided the services listed above] :  I provided the services listed above. [Severe Partial] : severe partial

## 2024-09-09 ENCOUNTER — APPOINTMENT (OUTPATIENT)
Dept: ORTHOPEDIC SURGERY | Facility: CLINIC | Age: 49
End: 2024-09-09

## 2024-09-16 ENCOUNTER — APPOINTMENT (OUTPATIENT)
Dept: PAIN MANAGEMENT | Facility: CLINIC | Age: 49
End: 2024-09-16
Payer: OTHER MISCELLANEOUS

## 2024-09-16 VITALS — WEIGHT: 258 LBS | HEIGHT: 75 IN | BODY MASS INDEX: 32.08 KG/M2

## 2024-09-16 DIAGNOSIS — M51.36 OTHER INTERVERTEBRAL DISC DEGENERATION, LUMBAR REGION: ICD-10-CM

## 2024-09-16 DIAGNOSIS — M54.51 VERTEBROGENIC LOW BACK PAIN: ICD-10-CM

## 2024-09-16 DIAGNOSIS — M47.816 SPONDYLOSIS W/OUT MYELOPATHY OR RADICULOPATHY, LUMBAR REGION: ICD-10-CM

## 2024-09-16 DIAGNOSIS — M54.16 RADICULOPATHY, LUMBAR REGION: ICD-10-CM

## 2024-09-16 PROCEDURE — 99214 OFFICE O/P EST MOD 30 MIN: CPT

## 2024-09-16 NOTE — WORK
[Severe Partial] : severe partial [No Rx restrictions] : No Rx restrictions. [I provided the services listed above] :  I provided the services listed above. [Moderate Partial] : moderate partial

## 2024-09-16 NOTE — PHYSICAL EXAM
[de-identified] : Constitutional:   - No acute distress   - Well developed; well nourished    Neurological:   - normal mood and affect   - alert and oriented x 3     Cardiovascular:   - grossly normal   Lumbar Spine Exam:   Inspection: erythema (-)  ecchymosis (-)  rashes (-)  alignment: no scoliosis  Well healed surgical scar midline  Palpation:  Midline lumbar tenderness:            (-)  midline thoracic tenderness:          (-)  Lumbar paraspinal tenderness:  L (+) ; R (-)  thoracic paraspinal tenderness: L (-) ; R (-)  sciatic nerve tenderness :          L (-) ; R (-)  SI joint tenderness:                     L (-) ; R (-)  GTB tenderness:                        L (-);  R (-)   ROM: WNL  pain with extreme extension  Strength:                                     Right       Left     Hip Flexion:                (5/5)       (5/5)  Quadriceps:               (5/5)       (5/5)  Hamstrings:                (5/5)       (5/5)  Ankle Dorsiflexion:     (5/5)       (5/5)  EHL:                           (5/5)       (5/5)  Ankle Plantarflexion:  (5/5)       (5/5)   Special Tests:  SLR:                            R (-) ; L (=)  Facet loading:             R (-) ; L (+)  SABRINA test:                R (-) ; L (-)  Hamstring tightness:   R (+);  L (+)   Neurologic:  SILT throughout right lower extremity  SILT throughout left lower extremity   Reflexes normal and symmetric bilateral lower extremities   Gait:  non- antalgic gait  ambulates without assistive device
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
## 2024-09-16 NOTE — PHYSICAL EXAM
[de-identified] : Constitutional:   - No acute distress   - Well developed; well nourished    Neurological:   - normal mood and affect   - alert and oriented x 3     Cardiovascular:   - grossly normal   Lumbar Spine Exam:   Inspection: erythema (-)  ecchymosis (-)  rashes (-)  alignment: no scoliosis  Well healed surgical scar midline  Palpation:  Midline lumbar tenderness:            (-)  midline thoracic tenderness:          (-)  Lumbar paraspinal tenderness:  L (+) ; R (-)  thoracic paraspinal tenderness: L (-) ; R (-)  sciatic nerve tenderness :          L (-) ; R (-)  SI joint tenderness:                     L (-) ; R (-)  GTB tenderness:                        L (-);  R (-)   ROM: WNL  pain with extreme extension  Strength:                                     Right       Left     Hip Flexion:                (5/5)       (5/5)  Quadriceps:               (5/5)       (5/5)  Hamstrings:                (5/5)       (5/5)  Ankle Dorsiflexion:     (5/5)       (5/5)  EHL:                           (5/5)       (5/5)  Ankle Plantarflexion:  (5/5)       (5/5)   Special Tests:  SLR:                            R (-) ; L (=)  Facet loading:             R (-) ; L (+)  SABRINA test:                R (-) ; L (-)  Hamstring tightness:   R (+);  L (+)   Neurologic:  SILT throughout right lower extremity  SILT throughout left lower extremity   Reflexes normal and symmetric bilateral lower extremities   Gait:  non- antalgic gait  ambulates without assistive device

## 2024-09-16 NOTE — ASSESSMENT
[FreeTextEntry1] : A discussion regarding available pain management treatment options occurred with the patient.  These included interventional, rehabilitative, pharmacological, and alternative modalities. We will proceed with the following:    Interventional treatment options:  - Proceed with L3-L4, L4-L5 BVN ablation (Intracept procedure) with fluoroscopic guidance; portal consents signed today - Will consider lumbar facet directed intervention for ongoing axial low back pain - see additional instructions below    Rehabilitative options:  - Completed prior physical therapy trial - participation in active HEP was discussed and encouraged as tolerated - Home exercise sheet provided at prior visit  Medication based treatment options: - Down titrate and discontinue gabapentin - continue ibuprofen 400-600 mg up to TID as needed - continue cyclobenzaprine 5 mg up to TID as needed for spasm - see additional instructions below    Complementary treatment options:   - Weight management and lifestyle modifications discussed - Patient will continue weight loss measures  Additional treatment recommendations as follows:   - patient will follow-up with Dr. Gage as directed - Follow up 1-2 weeks post injection for assessment of efficacy and further treatment recommendations  We have discussed the risks, benefits, and alternatives for NSAID therapy including but not limited to the risk of bleeding, thrombosis, gastric mucosal irritation/ulceration, allergic reaction and kidney dysfunction.  The patient verbalizes an understanding.  The risks, benefits and alternatives of the proposed Basivertebral nerve ablation procedure were explained in detail with the patient. The risks outlined include but are not limited to infection, bleeding, nerve injury, worsening of pain, failure to resolve symptoms, and allergic reaction.  All questions were answered to patient's apparent satisfaction and he/she verbalized an understanding.                                                                                        ****Request authorization/schedule Intracept at (L3 L4 L5)****   The risks, benefits, and alternatives to the Intracept procedure were discussed with the patient, and they understand and wish to proceed, there are no contraindications to the performance of the procedure. The patient has had LBP for more than 12 months and has failed multiple structured attempts to resolve the pain for more than 6 months, including (physical therapy, physician-directed home exercise program, lifestyle modification, posture correction, biomechanics education, NSAIDs, chiropractic care, acupuncture, massage therapy, medication).    The MRI demonstrates Modic degenerative endplate changes at (L3 L4 L5). The patient has radiographic evidence of other pathologies besides the Modic changes, however none of them are the predominant cause of the patient's complaints.  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  The patient's predominant physical complaint is due to Modic changes. The patient's other radiographic findings are not pertinent.   The patient has undergone careful screening by a multi-disciplinary team that includes, the (PCP, the physical therapist, chiropractor, massage therapist, our advanced practice providers) in addition to me.  The patient underwent psychological screening by me and there is no evidence of untreated substance abuse disorder.

## 2025-05-29 ENCOUNTER — NON-APPOINTMENT (OUTPATIENT)
Age: 50
End: 2025-05-29